# Patient Record
Sex: FEMALE | Race: WHITE | Employment: UNEMPLOYED | URBAN - METROPOLITAN AREA
[De-identification: names, ages, dates, MRNs, and addresses within clinical notes are randomized per-mention and may not be internally consistent; named-entity substitution may affect disease eponyms.]

---

## 2024-04-15 ENCOUNTER — LAB ENCOUNTER (OUTPATIENT)
Dept: LAB | Age: 29
End: 2024-04-15
Attending: ADVANCED PRACTICE MIDWIFE
Payer: COMMERCIAL

## 2024-04-15 ENCOUNTER — HOSPITAL ENCOUNTER (OUTPATIENT)
Dept: ULTRASOUND IMAGING | Facility: HOSPITAL | Age: 29
Discharge: HOME | End: 2024-04-15
Attending: ADVANCED PRACTICE MIDWIFE
Payer: COMMERCIAL

## 2024-04-15 ENCOUNTER — OFFICE VISIT (OUTPATIENT)
Dept: OBGYN CLINIC | Facility: CLINIC | Age: 29
End: 2024-04-15

## 2024-04-15 ENCOUNTER — TELEPHONE (OUTPATIENT)
Dept: OBGYN CLINIC | Facility: CLINIC | Age: 29
End: 2024-04-15

## 2024-04-15 VITALS
SYSTOLIC BLOOD PRESSURE: 131 MMHG | HEIGHT: 64 IN | WEIGHT: 180 LBS | HEART RATE: 74 BPM | DIASTOLIC BLOOD PRESSURE: 80 MMHG | BODY MASS INDEX: 30.73 KG/M2

## 2024-04-15 DIAGNOSIS — Z83.49 FAMILY HISTORY OF THYROID DISORDER: ICD-10-CM

## 2024-04-15 DIAGNOSIS — O26.859 SPOTTING IN PREGNANCY (HCC): ICD-10-CM

## 2024-04-15 DIAGNOSIS — O36.80X0 PREGNANCY WITH INCONCLUSIVE FETAL VIABILITY, SINGLE OR UNSPECIFIED FETUS (HCC): Primary | ICD-10-CM

## 2024-04-15 DIAGNOSIS — N76.0 VAGINITIS AND VULVOVAGINITIS: ICD-10-CM

## 2024-04-15 DIAGNOSIS — N92.6 MISSED MENSES: Primary | ICD-10-CM

## 2024-04-15 DIAGNOSIS — Z11.3 SCREEN FOR STD (SEXUALLY TRANSMITTED DISEASE): ICD-10-CM

## 2024-04-15 LAB
B-HCG SERPL-ACNC: ABNORMAL MIU/ML
CONTROL LINE PRESENT WITH A CLEAR BACKGROUND (YES/NO): YES YES/NO
PREGNANCY TEST, URINE: POSITIVE
RH BLOOD TYPE: POSITIVE
T4 FREE SERPL-MCNC: 1.2 NG/DL (ref 0.8–1.7)
TSI SER-ACNC: 2.06 MIU/ML (ref 0.55–4.78)

## 2024-04-15 PROCEDURE — 76801 OB US < 14 WKS SINGLE FETUS: CPT | Performed by: ADVANCED PRACTICE MIDWIFE

## 2024-04-15 PROCEDURE — 81025 URINE PREGNANCY TEST: CPT | Performed by: ADVANCED PRACTICE MIDWIFE

## 2024-04-15 PROCEDURE — 99203 OFFICE O/P NEW LOW 30 MIN: CPT | Performed by: ADVANCED PRACTICE MIDWIFE

## 2024-04-15 PROCEDURE — 3079F DIAST BP 80-89 MM HG: CPT | Performed by: ADVANCED PRACTICE MIDWIFE

## 2024-04-15 PROCEDURE — 3075F SYST BP GE 130 - 139MM HG: CPT | Performed by: ADVANCED PRACTICE MIDWIFE

## 2024-04-15 PROCEDURE — 84439 ASSAY OF FREE THYROXINE: CPT | Performed by: ADVANCED PRACTICE MIDWIFE

## 2024-04-15 PROCEDURE — 86900 BLOOD TYPING SEROLOGIC ABO: CPT | Performed by: ADVANCED PRACTICE MIDWIFE

## 2024-04-15 PROCEDURE — 3008F BODY MASS INDEX DOCD: CPT | Performed by: ADVANCED PRACTICE MIDWIFE

## 2024-04-15 PROCEDURE — 84443 ASSAY THYROID STIM HORMONE: CPT | Performed by: ADVANCED PRACTICE MIDWIFE

## 2024-04-15 PROCEDURE — 76817 TRANSVAGINAL US OBSTETRIC: CPT | Performed by: ADVANCED PRACTICE MIDWIFE

## 2024-04-15 PROCEDURE — 86901 BLOOD TYPING SEROLOGIC RH(D): CPT | Performed by: ADVANCED PRACTICE MIDWIFE

## 2024-04-15 PROCEDURE — 84702 CHORIONIC GONADOTROPIN TEST: CPT | Performed by: ADVANCED PRACTICE MIDWIFE

## 2024-04-15 NOTE — PROGRESS NOTES
Subjective:   Patient ID: Arlen Oliveira is a 28 year old female.    Arlen presents for missed menses visit and with concern for spotting. LMP 2/8/24 with regular menses. +HPT 3/16/24. Had miscarriage in January at 5wks    Has been feeling well. 7pm last night had brown spotting that changed to bright red. Bleeding lasted an hour and then had spotting. Gone this morning. Has had some mild cramping over last few weeks. [place] on Friday. Denies vaginal itching, irritation.     Denies other medical conditions        History/Other:   Review of Systems   Constitutional:  Negative for fever.   Genitourinary:  Positive for pelvic pain (mild cramping) and vaginal bleeding. Negative for vaginal discharge and vaginal pain.   All other systems reviewed and are negative.    No current outpatient medications on file.     Allergies:No Known Allergies    Objective:   Physical Exam  Vitals and nursing note reviewed.   Constitutional:       General: She is not in acute distress.     Appearance: Normal appearance. She is not ill-appearing, toxic-appearing or diaphoretic.   Cardiovascular:      Pulses: Normal pulses.   Pulmonary:      Effort: Pulmonary effort is normal.   Genitourinary:     General: Normal vulva.      Labia:         Right: No rash, tenderness, lesion or injury.         Left: No rash, tenderness, lesion or injury.       Vagina: No signs of injury and foreign body. Vaginal discharge (slightly brown discharge) present. No erythema, tenderness, bleeding, lesions or prolapsed vaginal walls.      Cervix: No cervical motion tenderness, discharge, friability, lesion, erythema, cervical bleeding or eversion.      Uterus: Enlarged (6wks). Not deviated, not fixed, not tender and no uterine prolapse.       Adnexa:         Right: No mass, tenderness or fullness.          Left: No mass, tenderness or fullness.     Neurological:      Mental Status: She is alert and oriented to person, place, and time.   Psychiatric:          Mood and Affect: Mood normal.         Behavior: Behavior normal.         Thought Content: Thought content normal.         Judgment: Judgment normal.         Assessment & Plan:   1. Missed menses    2. Spotting in pregnancy (HCC)    3. Family history of thyroid disorder    4. Vaginitis and vulvovaginitis    5. Screen for STD (sexually transmitted disease)        Orders Placed This Encounter   Procedures    POC Urine pregnancy test [26369]    HCG, Beta Subunit (Quant Pregnancy Test)    TSH and Free T4    Patient Blood Type (ABORh)    Vaginitis Vaginosis PCR Panel    Chlamydia/GC PCR Combo       Meds This Visit:  Requested Prescriptions      No prescriptions requested or ordered in this encounter       Imaging & Referrals:  None    1.  Discussed importance of folic acid, calcium, vitamin D.   2.  Reviewed pregnancy recommendations regarding weight gain, diet, fish consumption, consumption of deli-meats and hot dogs, making sure food is appropriately cooked and washed to avoid food-borne illnesses.    3.  Travel discussed, avoid travel to Covid zones, use of support stockings with flights longer than 3 hours, movement every 2-3 hours if driving  4.  Discussed exercise, avoid jacuzzi, sauna, hot tubs and steam rooms.   5.  Discussed avoidance of alcohol, smoking, and minimizing caffeine.    6.  Warning signs reviewed advised to call office if occur.    7.  First Trimester chromosomal screening Cell free Fetal DNA and US schedule discussed.  Desires First trimester screen testing  8. Genetic screening and ACOG/ ACMG guidelines for CF, SMA, Fragile X & hemoglobinopathies.  Informrd our offices uses the Preparent Standard Panel but other sources are available.  If interested they should check their insurance and various costs  9. ONTD risks discussed. No risk factors identified.     Hcg and ultrasound ordered for spotting. Will discuss plan once resulted    13.  30 minutes face to face counseling, chart review, orders and  coordination of care

## 2024-04-15 NOTE — TELEPHONE ENCOUNTER
Discussed ultrasound results with patient and recommended repeat HCG on Wednesday or Thursday and repeat ultrasound in 1 week. Reviewed ectopic and miscarriage warning signs and pt instructed to call with any. Pt verbalized understanding and agreement with all instructions and plan.

## 2024-04-16 ENCOUNTER — TELEPHONE (OUTPATIENT)
Dept: OBGYN CLINIC | Facility: CLINIC | Age: 29
End: 2024-04-16

## 2024-04-16 DIAGNOSIS — O36.80X0 PREGNANCY WITH INCONCLUSIVE FETAL VIABILITY, SINGLE OR UNSPECIFIED FETUS (HCC): Primary | ICD-10-CM

## 2024-04-16 LAB
BV BACTERIA DNA VAG QL NAA+PROBE: NEGATIVE
C GLABRATA DNA VAG QL NAA+PROBE: NEGATIVE
C KRUSEI DNA VAG QL NAA+PROBE: NEGATIVE
C TRACH DNA SPEC QL NAA+PROBE: NEGATIVE
CANDIDA DNA VAG QL NAA+PROBE: POSITIVE
N GONORRHOEA DNA SPEC QL NAA+PROBE: NEGATIVE
T VAGINALIS DNA VAG QL NAA+PROBE: NEGATIVE

## 2024-04-16 NOTE — TELEPHONE ENCOUNTER
Name and  verified    Order placed with [facility] to see if they have availability that is closer. Patient provided scheduling information.

## 2024-04-16 NOTE — TELEPHONE ENCOUNTER
Pt is asking if office can get her closer ultrasound appt for next week, Pt is booked in [place] , said too far

## 2024-04-18 ENCOUNTER — TELEPHONE (OUTPATIENT)
Dept: OBGYN CLINIC | Facility: CLINIC | Age: 29
End: 2024-04-18

## 2024-04-18 NOTE — TELEPHONE ENCOUNTER
Spoke with patient and answered all of her questions. She will call us when she gets a notice that her bHCG is back and we can discuss updates at that point. Reviewed bleeding precautions. She voiced understanding and agreed with plan. All questions answered.

## 2024-04-18 NOTE — TELEPHONE ENCOUNTER
Pt is waiting at [facility] no HCG order ,   Fax @ [phone]  Pt is waiting

## 2024-04-18 NOTE — TELEPHONE ENCOUNTER
10w 0d      Pt calling back to continue discussion. Pt asking about management of possible miscarriage. Overnight bleeding saturated pad approximately 50% per pt. Period-like bleeding continues today. Pt currently reports mild cramping, tolerable pain level. Pt just had HCG level drawn at [facility]. Pt has follow-up US scheduled Tuesday.    Pt request to discuss plan of care with Midwife. Call placed to TM, on-call. Lmtcb. Also routing message.

## 2024-04-18 NOTE — TELEPHONE ENCOUNTER
Attempted to reach patient by phone to review her concerns related to other telephone message of this date. No answer. Left brief voicemail. Stated she should call back and ask the nurse to page me.

## 2024-04-18 NOTE — TELEPHONE ENCOUNTER
Order faxed to [facility] per pt request. Call placed to pt to inform. LEXY.     Pt has additional questions. Pt with history of miscarriage in January and is currently having vaginal bleeding. US showed inconclusive fetal viability. Call interrupted due to pt currently at [facility] and was called to complete labs. Pt states she will call back. Awaiting return call.

## 2024-04-19 ENCOUNTER — TELEPHONE (OUTPATIENT)
Dept: OBGYN CLINIC | Facility: CLINIC | Age: 29
End: 2024-04-19

## 2024-04-19 LAB — HCG, TOTAL, QN: ABNORMAL MIU/ML

## 2024-04-19 NOTE — TELEPHONE ENCOUNTER
Pt called to follow up on appt for 4/23 after ultrasound. Informed Pt awaiting response from Marlen. She will wait to hear back.

## 2024-04-19 NOTE — TELEPHONE ENCOUNTER
Pregnancy with inconclusive fetal viability. Ultrasound on 04/15/24 showed sac with no pole or yolk sac. Hcg done yesterday per [facility]. Pt has ultrasound scheduled for this Tuesday. Pt had moderate bleeding yesterday. Pt wants to know if she still needs to complete the ultrasound for Tuesday. Please advise.

## 2024-04-19 NOTE — TELEPHONE ENCOUNTER
Advised pt Marlen is advising that she keep her appointment for Tuesday 04/23/24. Pt voices understaning, but was wondering if her ultrasound could try to be moved to the [place] location since [place] is 1 hour from her. Ultrasound called and pt able to be scheduled for 3 pm on Tuesday in [place]. Pt has a post miscarriage appointment with Marlen at 11:15 am that same day in [place]. Pt would like to schedule her appointment for after the ultrasound just to make it easier for her and her . No openings noted. Can pt be fit in with Marlen after her ultrasound?

## 2024-04-20 DIAGNOSIS — O03.4 INCOMPLETE SPONTANEOUS ABORTION (HCC): Primary | ICD-10-CM

## 2024-04-20 RX ORDER — MISOPROSTOL 200 UG/1
800 TABLET ORAL ONCE
Qty: 8 TABLET | Refills: 0 | Status: SHIPPED | OUTPATIENT
Start: 2024-04-20 | End: 2024-04-20

## 2024-04-20 NOTE — TELEPHONE ENCOUNTER
Confirmed that patient can initiate misoprostol 800mcg PV and repeat in 12 hours if pregnancy not fully passed. Pt notified by phone and eRx sent. Again reviewed bleeding precautions and need for follow-up in our office on Tuesday. Pt to keep scheduled Tuesday ultrasound at this point. She voiced understanding and agreed with plan. All questions answered.

## 2024-04-21 ENCOUNTER — TELEPHONE (OUTPATIENT)
Dept: OBGYN CLINIC | Facility: CLINIC | Age: 29
End: 2024-04-21

## 2024-04-21 DIAGNOSIS — O03.4 INCOMPLETE SPONTANEOUS ABORTION (HCC): Primary | ICD-10-CM

## 2024-04-21 RX ORDER — MISOPROSTOL 200 UG/1
800 TABLET ORAL ONCE
Qty: 4 TABLET | Refills: 0 | Status: SHIPPED | OUTPATIENT
Start: 2024-04-21 | End: 2024-04-21

## 2024-04-21 NOTE — TELEPHONE ENCOUNTER
Pt paged to report that she took the first dose of Cytotec yesterday afternoon. Continued to cramp into the evening, needing ibuprofen. Never passed anything that she would call products of conception. Today, cramping is less but bleeding is still like a period. States she would soak a pad in a few hours if she left it in place long enough. Has not taken ibuprofen since about midnight. Pt states that she accidentally dropped 2 of the pills into the toilet yesterday so needs a refill if she is to complete the second dose. eRx sent and patient advised to take the second dose. If no change in status, to move her appointment up from Tuesday to Monday morning. She voiced understanding and agreed with plan. All questions answered.

## 2024-04-22 ENCOUNTER — TELEPHONE (OUTPATIENT)
Dept: OBGYN CLINIC | Facility: CLINIC | Age: 29
End: 2024-04-22

## 2024-04-22 NOTE — TELEPHONE ENCOUNTER
Pt trying to get f/u appt after ultrasound at 3pm at [facility].  Pt still bleeding post miscarriage.    Not sure if someone can pull-up the ultrasound appt earlier, so she can be seen earlier.    Pls advise

## 2024-04-22 NOTE — TELEPHONE ENCOUNTER
Name and  verified    Patient reports she is having some bleeding, but denies going through pad in hour. Had cramping over weekend, but improving now. Unsure if she has pass products of conception.     Patient has ultrasound scheduled for tomorrow and wanted to schedule midwife appt for after ultrasound. Scheduled for appt with COSTA after ultrasound.

## 2024-04-23 ENCOUNTER — PATIENT MESSAGE (OUTPATIENT)
Dept: OBGYN CLINIC | Facility: CLINIC | Age: 29
End: 2024-04-23

## 2024-04-23 ENCOUNTER — LAB ENCOUNTER (OUTPATIENT)
Dept: LAB | Facility: HOSPITAL | Age: 29
End: 2024-04-23
Attending: ADVANCED PRACTICE MIDWIFE
Payer: COMMERCIAL

## 2024-04-23 ENCOUNTER — HOSPITAL ENCOUNTER (OUTPATIENT)
Dept: ULTRASOUND IMAGING | Facility: HOSPITAL | Age: 29
Discharge: HOME | End: 2024-04-23
Attending: ADVANCED PRACTICE MIDWIFE
Payer: COMMERCIAL

## 2024-04-23 ENCOUNTER — OFFICE VISIT (OUTPATIENT)
Dept: OBGYN CLINIC | Facility: CLINIC | Age: 29
End: 2024-04-23

## 2024-04-23 VITALS
SYSTOLIC BLOOD PRESSURE: 120 MMHG | DIASTOLIC BLOOD PRESSURE: 80 MMHG | WEIGHT: 181 LBS | BODY MASS INDEX: 31 KG/M2 | HEART RATE: 73 BPM

## 2024-04-23 DIAGNOSIS — Z13.0 SCREENING FOR DEFICIENCY ANEMIA: ICD-10-CM

## 2024-04-23 DIAGNOSIS — Z87.59 HISTORY OF MISCARRIAGE: ICD-10-CM

## 2024-04-23 DIAGNOSIS — Z87.59 HISTORY OF MISCARRIAGE: Primary | ICD-10-CM

## 2024-04-23 DIAGNOSIS — O36.80X0 PREGNANCY WITH INCONCLUSIVE FETAL VIABILITY, SINGLE OR UNSPECIFIED FETUS (HCC): ICD-10-CM

## 2024-04-23 LAB
B-HCG SERPL-ACNC: 568.5 MIU/ML
DEPRECATED RDW RBC AUTO: 41.6 FL (ref 35.1–46.3)
ERYTHROCYTE [DISTWIDTH] IN BLOOD BY AUTOMATED COUNT: 11.9 % (ref 11–15)
HCT VFR BLD AUTO: 39.6 %
HGB BLD-MCNC: 13.6 G/DL
MCH RBC QN AUTO: 32.5 PG (ref 26–34)
MCHC RBC AUTO-ENTMCNC: 34.3 G/DL (ref 31–37)
MCV RBC AUTO: 94.5 FL
PLATELET # BLD AUTO: 268 10(3)UL (ref 150–450)
RBC # BLD AUTO: 4.19 X10(6)UL
THYROPEROXIDASE AB SERPL-ACNC: 29 U/ML (ref ?–60)
WBC # BLD AUTO: 13.9 X10(3) UL (ref 4–11)

## 2024-04-23 PROCEDURE — 3074F SYST BP LT 130 MM HG: CPT | Performed by: ADVANCED PRACTICE MIDWIFE

## 2024-04-23 PROCEDURE — 86147 CARDIOLIPIN ANTIBODY EA IG: CPT | Performed by: ADVANCED PRACTICE MIDWIFE

## 2024-04-23 PROCEDURE — 76817 TRANSVAGINAL US OBSTETRIC: CPT | Performed by: ADVANCED PRACTICE MIDWIFE

## 2024-04-23 PROCEDURE — 85610 PROTHROMBIN TIME: CPT | Performed by: ADVANCED PRACTICE MIDWIFE

## 2024-04-23 PROCEDURE — 85390 FIBRINOLYSINS SCREEN I&R: CPT | Performed by: ADVANCED PRACTICE MIDWIFE

## 2024-04-23 PROCEDURE — 85598 HEXAGNAL PHOSPH PLTLT NEUTRL: CPT | Performed by: ADVANCED PRACTICE MIDWIFE

## 2024-04-23 PROCEDURE — 85730 THROMBOPLASTIN TIME PARTIAL: CPT | Performed by: ADVANCED PRACTICE MIDWIFE

## 2024-04-23 PROCEDURE — 76801 OB US < 14 WKS SINGLE FETUS: CPT | Performed by: ADVANCED PRACTICE MIDWIFE

## 2024-04-23 PROCEDURE — 3079F DIAST BP 80-89 MM HG: CPT | Performed by: ADVANCED PRACTICE MIDWIFE

## 2024-04-23 PROCEDURE — 86376 MICROSOMAL ANTIBODY EACH: CPT | Performed by: ADVANCED PRACTICE MIDWIFE

## 2024-04-23 PROCEDURE — 85613 RUSSELL VIPER VENOM DILUTED: CPT | Performed by: ADVANCED PRACTICE MIDWIFE

## 2024-04-23 PROCEDURE — 85027 COMPLETE CBC AUTOMATED: CPT | Performed by: ADVANCED PRACTICE MIDWIFE

## 2024-04-23 PROCEDURE — 99214 OFFICE O/P EST MOD 30 MIN: CPT | Performed by: ADVANCED PRACTICE MIDWIFE

## 2024-04-23 PROCEDURE — 84702 CHORIONIC GONADOTROPIN TEST: CPT | Performed by: ADVANCED PRACTICE MIDWIFE

## 2024-04-23 NOTE — PROGRESS NOTES
[facility]  Midwifery Focused Gynecology Problem Exam    Arlen Oliveira is a 28 year old female presenting for Follow - Up ([facility] follow up )  .    HPI:     Chief Complaint   Patient presents with    Follow - Up     MAB follow up      Here for miscarriage follow up.     Sunday night bleeding like a period. Has had cramping. Has been taking motrin & tylenol.    Did 2 doses of cytotec over the weekend. One Saturday and one Sunday.  Right before ultrasound passed what appeared to be a large sac and one smaller clot. Not soaking a pad in less than an hour. No dizziness. Cramping has decreased now.       Had SAB in January.   Component      Latest Ref Rng 4/15/2024  4/18/2024   ABO BLOOD TYPE O     RH Factor Positive     HCG QUANTITATIVE      <=4.2 mIU/mL 17,497.8 (H)     HCG, TOTAL, QN      mIU/mL  12,663 (H)       Legend:  (H) High      US PREG 1ST TRIM W/EV (CPT=76801/47583)    Result Date: 4/23/2024  PROCEDURE: US PREG 1ST TRIM W/EV (CPT=76801/07817)  COMPARISON: [facility], US PREG 1ST TRIM W/EV (CPT=76801/97565), 4/15/2024, 3:07 PM.  INDICATIONS: Pregnancy with inconclusive fetal viability, single or unspecified fetus.  TECHNIQUE: Transabdominal and endovaginal imaging for obstetrical and fetal evaluation was performed.  FINDINGS:  GESTATIONAL SAC: Not visualized. The previously seen candidate gestational sac is no longer evident. FETAL POLE/EMBRYO: Not apparent. YOLK SAC: Not seen. CARDIAC ACTIVITY: None detected. DEXTER VOLUME: Too early for assessment. PLACENTA LOCATION: Too early for assessment. UTERUS: Measures 8.3 x 4.7 x 5.5 cm with complex echogenicity within the thickened endometrial canal; complex echogenicity extends into the lower uterine segment.  MENSTRUAL AGE/MARIA DEL ROSARIO: 10 weeks, 5 days; 02/08/2024 CRL: Not applicable. ULTRASOUND AGE/MARIA DEL ROSARIO: Not applicable.  OVARIES AND ADNEXA:  RIGHT OVARY: Largely obscured by intervening bowel gas; a candidate right ovary measures 3.3 x 2.8 x 3.4 cm with  possible complex lesion measuring 1.9 x 1.6 x 2.2 cm. LEFT OVARY: Measures 3.0 x 2.2 x 1.8 cm. CUL-DE-SAC: Complex echogenic free pelvic fluid may be present.  OTHER: Negative.          CONCLUSION:  1. No sonographically discernible intrauterine pregnancy. Differential diagnostic possibilities include complete , early gestation which cannot be detected sonographically, or nonvisualized extrauterine/ectopic pregnancy. Continued serial quantitative beta hCG level analysis and followup sonography, as clinically warranted, are recommended.  2. Lesser incidental findings as above.    elm-remote.   Dictated by (CST): Francisco Nolan MD on 2024 at 4:08 PM     Finalized by (CST): rFancisco Nolan MD on 2024 at 4:13 PM          US PREG 1ST TRIM W/EV (CPT=76801/20491)    Result Date: 4/15/2024  PROCEDURE: US PREG 1ST TRIM W/EV (CPT=76801/82249)  COMPARISON: None.  INDICATIONS: Spotting in pregnancy  TECHNIQUE: Transabdominal and endovaginal imaging for obstetrical and fetal evaluation was performed.  FINDINGS: Normal bladder.  Anteverted uterus 10 x 4.5 x 5.5 centimeter.  Thickened endometrium, 2.4 centimeter.  1.4 x 0.8 x 0.9 centimeter gestational sac in the endometrial cavity but no yolk sac or fetal pole.  Normal-size ovaries.  Right ovary contains 2 centimeter corpus luteal cyst.  No adnexal mass.  No free fluid         CONCLUSION:   Pregnancy of unknown location  There is a fluid collection/gestational sac within the endometrial cavity but this does not contain a yolk sac or fetal pole  Clinical correlation and follow-up are needed     Dictated by (CST): Bradley Valle MD on 4/15/2024 at 4:02 PM     Finalized by (CST): Bradley Valle MD on 4/15/2024 at 4:04 PM            PHQ-2 not done in last 12 months! Please administer and refresh!         Depression Screening (PHQ-2/PHQ-9): Over the LAST 2 WEEKS                         Medications (Active prior to today's visit):  No current outpatient medications on  file.     Allergies:  No Known Allergies  HISTORY:   Menstrual History:  OB History    Para Term  AB Living   2 0 0 0 1 0   SAB IAB Ectopic Multiple Live Births   1 0 0 0 0      Patient's last menstrual period was 2024 (exact date).         No past medical history on file.  Past Surgical History:   Procedure Laterality Date    Spinal fusion      Helen teeth removed       Family History   Problem Relation Age of Onset    Other (bladder cancer) Mother     Hypertension Mother      Social History     Socioeconomic History    Marital status:      Spouse name: Not on file    Number of children: Not on file    Years of education: Not on file    Highest education level: Not on file   Occupational History    Not on file   Tobacco Use    Smoking status: Never    Smokeless tobacco: Never   Substance and Sexual Activity    Alcohol use: Not Currently     Alcohol/week: 1.0 standard drink of alcohol     Types: 1 Standard drinks or equivalent per week     Comment: soically    Drug use: Never    Sexual activity: Not on file   Other Topics Concern    Not on file   Social History Narrative    Not on file     Social Determinants of Health     Financial Resource Strain: Not on file   Food Insecurity: Not on file   Transportation Needs: Not on file   Stress: Not on file   Housing Stability: Not on file       ROS:   Review of Systems   Constitutional: Negative.    Respiratory: Negative.     Cardiovascular: Negative.    Gastrointestinal:  Positive for abdominal pain (cramping).   Genitourinary:  Positive for vaginal bleeding. Negative for difficulty urinating, dysuria, enuresis, flank pain, vaginal discharge and vaginal pain.   Neurological: Negative.         PHYSICAL EXAM:   /80   Pulse 73   Wt 181 lb (82.1 kg)   LMP 2024 (Exact Date)   BMI 31.07 kg/m²   Physical Exam  Constitutional:       General: She is not in acute distress.     Appearance: Normal appearance. She is not ill-appearing.    Pulmonary:      Effort: Pulmonary effort is normal.   Neurological:      Mental Status: She is alert and oriented to person, place, and time.   Psychiatric:         Behavior: Behavior normal.         Thought Content: Thought content normal.           ASSESSMENT:    Diagnoses and all orders for this visit:    History of miscarriage  -     Anticardiolipin AB, IgG/M, QN; Future  -     Lupus Anticoagulant Comp; Future  -     Thyroid Peroxidase (TPO) AB; Future  -     HCG, Beta Subunit (Quant Pregnancy Test); Future    Screening for deficiency anemia  -     CBC, Platelet; No Differential; Future      Discussed findings with pt. Sac no longer noted in uterus, this is likely what she passed right before ultrasound. I would expect bleeding to now taper down. Should monitor and if continued heavy bleeding or clots would be concerned with retained POC. If that occurs could need D & C. Reviewed her HCG levels. Recommend repeat today, if significant drop and bleeding remains light would montior weekly until back to zero. Recommend wait 1-2 cycles until TTC again.       She has many questions about why this happened and what she can do differently. Is interested in doing some basic bloodwork and desires referrel to Dr Rito Gaston for further evaluation.     We discussed that  miscarriage is common and that it is most often d/t chromosomal abnormalities. There are more extensive evals that can be done but typically after 3 consecutive losses as then considered recurrent losses. We discussed what those might include- microarray of her and , testing for clotting disorders, etc. We discussed her cycles and recommend monitoring for length of luteal phase, if short could be an issue with progesterone. Supplementation with progesterone in future pregnancy discussed and controversial nature of such. Options reviewed.     Alternative medicine options discussed- acupuncture, functional medicine.     My total time spent caring for  the patient on the day of the encounter:  35 minutes, which included: chart review, exam, care coordination, charting, and counseling as per above.        Ruth Damon CNM  4/23/2024  4:19 PM

## 2024-04-24 ENCOUNTER — TELEPHONE (OUTPATIENT)
Dept: OBGYN CLINIC | Facility: CLINIC | Age: 29
End: 2024-04-24

## 2024-04-24 DIAGNOSIS — O03.9 MISCARRIAGE (HCC): Primary | ICD-10-CM

## 2024-04-24 NOTE — TELEPHONE ENCOUNTER
PC to patient, notified of drop in HCG. To repeat in 1 wk. Would like to go to [facility]. Order placed fro [facility]. Referral for Dr Rito Gaston was placed. I notified her during her visit she should check with insurance regarding coverage. Also advised of + yeast. States she saw that and did an OTC monistat and symptoms seem to improve. She does have a few days left so desires just to use that.

## 2024-04-24 NOTE — TELEPHONE ENCOUNTER
Kelly Gauthier, RN 4/24/2024 8:35 AM CDT    Orders already placed for HCG at [facility]    Please advise regarding recommendation of ultrasound and referral  ----- Message -----  From: Arlen Oliveira  Sent: 4/23/2024 7:02 PM CDT  To: [facility] Ob/Gyne Midwifery Clinical Pool  Subject: F/u apt     Sounds good. Thank you for getting back to me. Are you able to put the order in for [facility] for next week? I can go next Tuesday. [facility] is closer to my house.  Also, I was wondering if you guys do saline flushed ultra sounds to look for polyps or if that’s something you think I should do?  Secondly, I forgot to follow up to receive the referral for - Heather myles ( fertility specialist) in [place]. Is that something you would be able to do and I will see what our insurance covers?  Thank you so much-  Arlen

## 2024-04-25 LAB
CARDIOLIPIN IGG SERPL-ACNC: 3.1 GPL (ref ?–10)
CARDIOLIPIN IGM SERPL-ACNC: 2.2 MPL (ref ?–10)

## 2024-04-26 ENCOUNTER — TELEPHONE (OUTPATIENT)
Dept: OBGYN CLINIC | Facility: CLINIC | Age: 29
End: 2024-04-26

## 2024-04-26 DIAGNOSIS — Z87.59 HISTORY OF MISCARRIAGE: Primary | ICD-10-CM

## 2024-04-26 LAB
APTT PPP: 29 SECONDS (ref 23.3–35.6)
CONFIRM APTT STACLOT: NEGATIVE
CONFIRM DRVVT: 1 S (ref 0–1.1)
PROTHROMBIN TIME: 13 SECONDS (ref 11.6–14.8)

## 2024-05-01 ENCOUNTER — TELEPHONE (OUTPATIENT)
Dept: OBGYN CLINIC | Facility: CLINIC | Age: 29
End: 2024-05-01

## 2024-05-01 LAB — HCG, TOTAL, QN: 71 MIU/ML

## 2024-05-01 NOTE — TELEPHONE ENCOUNTER
Pt Name and  verified.  HCG level resulted from yesterday and it is now 71.   Pt advised to complete test again in a week to see if the numbers continue to trend down. Voiced understanding.

## 2024-05-03 DIAGNOSIS — O03.9 MISCARRIAGE (HCC): Primary | ICD-10-CM

## 2024-05-07 ENCOUNTER — TELEPHONE (OUTPATIENT)
Dept: OBGYN CLINIC | Facility: CLINIC | Age: 29
End: 2024-05-07

## 2024-05-07 DIAGNOSIS — O26.851 SPOTTING AFFECTING PREGNANCY IN FIRST TRIMESTER (HCC): Primary | ICD-10-CM

## 2024-05-07 DIAGNOSIS — O03.9 SAB (SPONTANEOUS ABORTION) (HCC): ICD-10-CM

## 2024-05-07 NOTE — TELEPHONE ENCOUNTER
Patient states she has a yeast infection and tried to treat with over the counter medication, helped slightly but still having symptoms, wondering if something can be called in, leaving town this weekend. Please advise

## 2024-05-07 NOTE — TELEPHONE ENCOUNTER
Patient at [facility], still not seeing the fax for the HCG labwork.    Pls advise  Fax [phone]

## 2024-05-07 NOTE — TELEPHONE ENCOUNTER
LMTCB    Pt having symptoms of yeast since 04/24/24. Pt has been using Monistat with some relief, but still having vaginal irritation. Pt leaving to go out of town this weekend and would like medication ordered. Please advise.

## 2024-05-08 LAB — HCG, TOTAL, QN: 26 MIU/ML

## 2024-05-08 RX ORDER — FLUCONAZOLE 150 MG/1
150 TABLET ORAL
Qty: 2 TABLET | Refills: 0 | Status: SHIPPED | OUTPATIENT
Start: 2024-05-08 | End: 2024-05-12

## 2024-05-08 NOTE — TELEPHONE ENCOUNTER
I have sent in Rx for fluconazole. If symptoms not resolved with tx she should come into clinic for evaluation. Thanks COSTA

## 2024-05-10 DIAGNOSIS — O03.9 MISCARRIAGE (HCC): Primary | ICD-10-CM

## 2024-05-20 ENCOUNTER — TELEPHONE (OUTPATIENT)
Dept: OBGYN CLINIC | Facility: CLINIC | Age: 29
End: 2024-05-20

## 2024-05-20 NOTE — TELEPHONE ENCOUNTER
Pt had miscarriage on 04/23/24. Pt still doing Hcg levels. Last Hcg on 05/07/24-26. Pt wants to schedule appointment wit the [facility] to discuss reoccurring miscarriages. Pt had a few questions regarding scheduling appointment. Questions answered. Pt denies any other questions or concerns.

## 2024-05-20 NOTE — TELEPHONE ENCOUNTER
Patient was recently referred to Dr Chávez. She is hoping to discuss if there are other providers in that office that would be acceptable to see and if it is ok to be seen tomorrow 5/21. Please advise.

## 2024-05-22 ENCOUNTER — TELEPHONE (OUTPATIENT)
Dept: OBGYN CLINIC | Facility: CLINIC | Age: 29
End: 2024-05-22

## 2024-05-22 LAB — HCG, TOTAL, QN: 10 MIU/ML

## 2024-05-22 NOTE — TELEPHONE ENCOUNTER
Pt Name and  verified.  Pt states that she had a [facility] draw for hcg and it was 10.   Pt states that she began to have some cramping and bleeding. Advised that this can occur but we would still recommend having the repeat hcg in a week. Voiced understanding.

## 2024-05-29 DIAGNOSIS — O03.9 MISCARRIAGE (HCC): Primary | ICD-10-CM

## 2024-06-07 ENCOUNTER — MOBILE ENCOUNTER (OUTPATIENT)
Dept: OBGYN CLINIC | Facility: CLINIC | Age: 29
End: 2024-06-07

## 2024-06-07 DIAGNOSIS — O03.9 MISCARRIAGE (HCC): Primary | ICD-10-CM

## 2024-06-07 LAB — HCG, TOTAL, QN: 5 MIU/ML

## 2024-06-11 ENCOUNTER — LAB ENCOUNTER (OUTPATIENT)
Dept: LAB | Facility: HOSPITAL | Age: 29
End: 2024-06-11
Attending: ADVANCED PRACTICE MIDWIFE
Payer: COMMERCIAL

## 2024-06-11 ENCOUNTER — OFFICE VISIT (OUTPATIENT)
Dept: OBGYN CLINIC | Facility: CLINIC | Age: 29
End: 2024-06-11
Payer: COMMERCIAL

## 2024-06-11 ENCOUNTER — TELEPHONE (OUTPATIENT)
Dept: OBGYN CLINIC | Facility: CLINIC | Age: 29
End: 2024-06-11

## 2024-06-11 VITALS
SYSTOLIC BLOOD PRESSURE: 124 MMHG | DIASTOLIC BLOOD PRESSURE: 76 MMHG | HEIGHT: 64 IN | WEIGHT: 178.19 LBS | BODY MASS INDEX: 30.42 KG/M2

## 2024-06-11 DIAGNOSIS — O26.20 RECURRENT PREGNANCY LOSS, ANTEPARTUM CONDITION OR COMPLICATION (HCC): Primary | ICD-10-CM

## 2024-06-11 DIAGNOSIS — Z32.00 PREGNANCY EXAMINATION OR TEST, PREGNANCY UNCONFIRMED: Primary | ICD-10-CM

## 2024-06-11 DIAGNOSIS — O03.9 MISCARRIAGE (HCC): Primary | ICD-10-CM

## 2024-06-11 LAB — B-HCG SERPL-ACNC: 5.9 MIU/ML

## 2024-06-11 PROCEDURE — 3078F DIAST BP <80 MM HG: CPT | Performed by: OBSTETRICS & GYNECOLOGY

## 2024-06-11 PROCEDURE — 99244 OFF/OP CNSLTJ NEW/EST MOD 40: CPT | Performed by: OBSTETRICS & GYNECOLOGY

## 2024-06-11 PROCEDURE — 3008F BODY MASS INDEX DOCD: CPT | Performed by: OBSTETRICS & GYNECOLOGY

## 2024-06-11 PROCEDURE — 84702 CHORIONIC GONADOTROPIN TEST: CPT | Performed by: ADVANCED PRACTICE MIDWIFE

## 2024-06-11 PROCEDURE — 3074F SYST BP LT 130 MM HG: CPT | Performed by: OBSTETRICS & GYNECOLOGY

## 2024-06-11 NOTE — TELEPHONE ENCOUNTER
Pt scheduled 7/2 with DOV, encouraged pt to ask for myself if appt needs to be rescheduled to ease scheduling.      ----- Message from Sheila Chávez sent at 6/11/2024 11:48 AM CDT -----  Regarding: scheduling SIS  HIArlen has had recurrent pregnancy loss (miscarriage x 2) and I'd like to schedule an SIS for her.    She should get her next period end June / early July.  Can you please help her schedule SIS in the 10 or so days after that period?    Thank you!!  ~DOV

## 2024-06-11 NOTE — PATIENT INSTRUCTIONS
Possible additional lab:  Karyotype (to check your genetic make-up)    Tomás - semen analysis - (either primary care, or urologist)    Saline infusion ultrasound  Pro-fertility vitamins

## 2024-06-11 NOTE — PROGRESS NOTES
New Patient GYN History and Physical  [facility]    CHIEF COMPLAINT:    Chief Complaint   Patient presents with    Follow - Up     Referred by midwives. C/o 2 SAB in the last 4 months       HISTORY OF PRESENT ILLNESS:   Arlen Oliveira is a 28 year old female   who presents for consultation for recurrent pregnancy loss, sent by SHANELL Damon.      Normal APL labs in April, during .  TSH normal in April.    1st miscarriage  about 6 wks - had one period after this then got pregnant.  Then again April about 8-9 wks      Started bleeding and called on-call, and took some cytotec to help the process, US showed no cardiac activity.  Took a long time to pass the sac. Had to take 2 cytotec doses - passed the sac right before the US.    HCG was 10 when she got period.  Period was lighter than usual, and a little longer, other symptoms were more mild.      PAST MEDICAL HISTORY:   Past Medical History:    Recurrent pregnancy loss        PAST SURGICAL HISTORY:   Past Surgical History:   Procedure Laterality Date    Spinal fusion      [place] teeth removed          PAST OB HISTORY:  OB History    Para Term  AB Living   2       2     SAB IAB Ectopic Multiple Live Births   2              # Outcome Date GA Lbr Brennon/2nd Weight Sex Type Anes PTL Lv   2 SAB 2024     Biochemical      1 SAB                CURRENT MEDICATIONS:    No current outpatient medications on file.    ALLERGIES:  No Known Allergies    SOCIAL HISTORY:  Social History     Socioeconomic History    Marital status:    Tobacco Use    Smoking status: Never    Smokeless tobacco: Never   Substance and Sexual Activity    Alcohol use: Not Currently     Alcohol/week: 1.0 standard drink of alcohol     Types: 1 Standard drinks or equivalent per week     Comment: socially, couple times per month    Drug use: Never    Sexual activity: Yes     Partners: Male   Other Topics Concern    Blood Transfusions No       FAMILY  HISTORY:  Family History   Problem Relation Age of Onset    No Known Problems Father     Other (bladder cancer) Mother     Hypertension Mother     Blood Cancer Maternal Grandmother     Other (lung cancer) Maternal Grandfather     Cancer Paternal Grandmother         brain spread to breast    No Known Problems Sister     No Known Problems Brother      ASSESSMENTS:  PHYSICAL EXAM:   Patient's last menstrual period was 06/02/2024 (exact date).   Vitals:    06/11/24 1105   BP: 124/76   Weight: 178 lb 3.2 oz (80.8 kg)   Height: 64\"     CONSTITUTIONAL: Awake, alert, cooperative, no apparent distress, and appears stated age   NECK: Supple, symmetrical, trachea midline, no adenopathy, thyroid symmetric, not enlarged and no tenderness  LUNGS: No excess work of breathing  MUSCULOSKELETAL: There is no redness, warmth, or swelling of the joints. Tone is normal.  NEUROLOGIC: Patient is awake, alert and oriented to name, place and time. Casual gait is normal.  SKIN: no bruising or bleeding and no rashes  PSYCHIATRIC: Behavior:  Appropriate  Mood:  appropriate  ASSESSMENT AND PLAN:  1. Recurrent pregnancy loss, antepartum condition or complication (HCC)  - reviewed course of previous pregnancies.  - APLS labs and TSH normal at time of most recent miscarriage. No need to repeat as they were all normal.  - could consider karyotype testing - but we discussed low yield of abnormalities - this might be something to consider if another miscarriage and is ready for feritlity specialist.  - discussed option for routine gyn US vs SIS vs hysteroscopy for evaluation of endometrial cavity for possible polyp. Is most interested in SIS - I recommend scheduling within 10 days of a period. Then, if polyp can schedule removal procedure. We discussed how a polyp can increase chance for miscarriage  - I recommend bibiana's  Tomás get semenalysis to assess for sperm morphology.  - infor given for [facility] support group for emotional support  throughout this journey.  - When pregnant again, will check HCG and progesterone - as early as possible.     Total face to face time was 60 minutes, more than 50% of the time was spent in counseling and/or coordination of care related to above.  follow up as needed  Sheila Chávez, DO

## 2024-07-02 ENCOUNTER — PATIENT MESSAGE (OUTPATIENT)
Dept: OBGYN CLINIC | Facility: CLINIC | Age: 29
End: 2024-07-02

## 2024-07-02 ENCOUNTER — OFFICE VISIT (OUTPATIENT)
Dept: OBGYN CLINIC | Facility: CLINIC | Age: 29
End: 2024-07-02
Payer: COMMERCIAL

## 2024-07-02 VITALS
SYSTOLIC BLOOD PRESSURE: 138 MMHG | DIASTOLIC BLOOD PRESSURE: 82 MMHG | HEIGHT: 64 IN | BODY MASS INDEX: 30.9 KG/M2 | WEIGHT: 181 LBS

## 2024-07-02 DIAGNOSIS — O26.20 RECURRENT PREGNANCY LOSS, ANTEPARTUM CONDITION OR COMPLICATION (HCC): Primary | ICD-10-CM

## 2024-07-02 PROCEDURE — 3075F SYST BP GE 130 - 139MM HG: CPT | Performed by: OBSTETRICS & GYNECOLOGY

## 2024-07-02 PROCEDURE — 58340 CATHETER FOR HYSTEROGRAPHY: CPT | Performed by: OBSTETRICS & GYNECOLOGY

## 2024-07-02 PROCEDURE — 76831 ECHO EXAM UTERUS: CPT | Performed by: OBSTETRICS & GYNECOLOGY

## 2024-07-02 PROCEDURE — 3079F DIAST BP 80-89 MM HG: CPT | Performed by: OBSTETRICS & GYNECOLOGY

## 2024-07-02 PROCEDURE — 3008F BODY MASS INDEX DOCD: CPT | Performed by: OBSTETRICS & GYNECOLOGY

## 2024-07-02 RX ORDER — MISOPROSTOL 200 UG/1
TABLET ORAL
COMMUNITY
Start: 2024-04-21 | End: 2024-07-02

## 2024-07-02 RX ORDER — PROGESTERONE 200 MG/1
200 CAPSULE ORAL NIGHTLY
Qty: 30 CAPSULE | Refills: 2 | Status: SHIPPED | OUTPATIENT
Start: 2024-07-02

## 2024-07-02 RX ORDER — CIPROFLOXACIN AND DEXAMETHASONE 3; 1 MG/ML; MG/ML
SUSPENSION/ DROPS AURICULAR (OTIC)
COMMUNITY
Start: 2024-01-25 | End: 2024-07-02

## 2024-07-02 NOTE — PROGRESS NOTES
Procedure explained to patient and consent obtained.     Speculum placed in vagina.   Betadine prep x 3.  Tenaculum placed on anterior cervix.  HSG balloon catheter placed through cervical os and balloon inflated with 2 ml sterile saline. Speculum removed.     Transvaginal U/S performed as saline injected through the catheter into the uterus.     Findings: no specific findings noted, normal appearing endometrium and b/l ovaries.    Following the procedure the balloon was deflated and catheter removed.  Tenaculum removed with excellent hemostasis noted.  Results discussed with patient.  Patient tolerated the procedure well.    Plan: will give progesterone for 1st trimester support. Patient to take once has + pregnancy test and send me a message to get labs / prental care started.        Medications    progesterone 200 MG Oral Cap         Sheila Chávez DO

## 2024-07-11 ENCOUNTER — TELEPHONE (OUTPATIENT)
Dept: OBGYN CLINIC | Facility: CLINIC | Age: 29
End: 2024-07-11

## 2024-07-25 ENCOUNTER — PATIENT MESSAGE (OUTPATIENT)
Dept: OBGYN CLINIC | Facility: CLINIC | Age: 29
End: 2024-07-25

## 2024-09-09 DIAGNOSIS — M43.26 FUSION OF SPINE OF LUMBAR REGION: ICD-10-CM

## 2024-09-09 DIAGNOSIS — M54.59 OTHER LOW BACK PAIN: Primary | ICD-10-CM

## 2024-09-12 ENCOUNTER — HOSPITAL ENCOUNTER (OUTPATIENT)
Dept: MRI IMAGING | Age: 29
Discharge: HOME | End: 2024-09-12
Attending: ORTHOPAEDIC SURGERY

## 2024-09-12 DIAGNOSIS — M43.26 FUSION OF SPINE OF LUMBAR REGION: ICD-10-CM

## 2024-09-12 DIAGNOSIS — M54.59 OTHER LOW BACK PAIN: ICD-10-CM

## 2024-09-12 PROCEDURE — 72148 MRI LUMBAR SPINE W/O DYE: CPT

## 2024-09-17 ENCOUNTER — APPOINTMENT (OUTPATIENT)
Dept: MRI IMAGING | Age: 29
End: 2024-09-17
Attending: ORTHOPAEDIC SURGERY

## 2024-09-30 ENCOUNTER — TELEPHONE (OUTPATIENT)
Dept: NEUROLOGY | Age: 29
End: 2024-09-30

## 2024-10-08 DIAGNOSIS — N96 RECURRENT PREGNANCY LOSS: Primary | ICD-10-CM

## 2024-11-11 ENCOUNTER — APPOINTMENT (OUTPATIENT)
Dept: GENERAL RADIOLOGY | Age: 29
End: 2024-11-11
Attending: ORTHOPAEDIC SURGERY

## 2024-12-13 ENCOUNTER — PATIENT MESSAGE (OUTPATIENT)
Dept: OBGYN CLINIC | Facility: CLINIC | Age: 29
End: 2024-12-13

## 2024-12-16 DIAGNOSIS — N96 RECURRENT PREGNANCY LOSS: Primary | ICD-10-CM

## 2025-04-28 ENCOUNTER — PATIENT MESSAGE (OUTPATIENT)
Dept: OBGYN CLINIC | Facility: CLINIC | Age: 30
End: 2025-04-28

## 2025-04-28 DIAGNOSIS — Z32.01 PREGNANCY EXAMINATION OR TEST, POSITIVE RESULT (HCC): Primary | ICD-10-CM

## 2025-04-29 ENCOUNTER — LAB ENCOUNTER (OUTPATIENT)
Dept: LAB | Facility: HOSPITAL | Age: 30
End: 2025-04-29
Attending: OBSTETRICS & GYNECOLOGY
Payer: COMMERCIAL

## 2025-04-29 DIAGNOSIS — Z32.01 PREGNANCY EXAMINATION OR TEST, POSITIVE RESULT (HCC): ICD-10-CM

## 2025-04-29 LAB
B-HCG SERPL-ACNC: ABNORMAL MIU/ML (ref ?–4.2)
PROGEST SERPL-MCNC: 12.4 NG/ML
PROGESTERONE: 15.3 NG/ML

## 2025-04-29 PROCEDURE — 84144 ASSAY OF PROGESTERONE: CPT | Performed by: OBSTETRICS & GYNECOLOGY

## 2025-04-29 PROCEDURE — 84702 CHORIONIC GONADOTROPIN TEST: CPT | Performed by: OBSTETRICS & GYNECOLOGY

## 2025-05-02 DIAGNOSIS — Z34.90 EARLY STAGE OF PREGNANCY (HCC): Primary | ICD-10-CM

## 2025-05-02 LAB — HCG, TOTAL, QN: ABNORMAL MIU/ML

## 2025-05-07 ENCOUNTER — ULTRASOUND ENCOUNTER (OUTPATIENT)
Dept: OBGYN CLINIC | Facility: CLINIC | Age: 30
End: 2025-05-07
Payer: COMMERCIAL

## 2025-05-07 DIAGNOSIS — O36.80X0 ENCOUNTER TO DETERMINE FETAL VIABILITY OF PREGNANCY, SINGLE OR UNSPECIFIED FETUS (HCC): Primary | ICD-10-CM

## 2025-05-07 PROCEDURE — 76817 TRANSVAGINAL US OBSTETRIC: CPT | Performed by: OBSTETRICS & GYNECOLOGY

## 2025-05-08 ENCOUNTER — PATIENT MESSAGE (OUTPATIENT)
Dept: OBGYN CLINIC | Facility: CLINIC | Age: 30
End: 2025-05-08

## 2025-05-11 ENCOUNTER — TELEPHONE (OUTPATIENT)
Dept: OBGYN CLINIC | Facility: CLINIC | Age: 30
End: 2025-05-11

## 2025-05-11 RX ORDER — METOCLOPRAMIDE 10 MG/1
10 TABLET ORAL EVERY 6 HOURS PRN
Qty: 20 TABLET | Refills: 0 | Status: SHIPPED | OUTPATIENT
Start: 2025-05-11

## 2025-05-11 NOTE — TELEPHONE ENCOUNTER
Returned patient's call regarding nausea and vomiting. Reports nausea and vomiting all night, unable to keep anything down including fluids. Now seeing flecks of blood in the vomit. Would prefer to avoid the ED. Rx for Reglan sent to pharmacy. Advised patient to go slow and focus on hydration. If unable to tolerate fluids despite Reglan she should present to the ED, patient agrees.     Nieves Song, DO

## 2025-05-16 ENCOUNTER — TELEPHONE (OUTPATIENT)
Dept: OBGYN CLINIC | Facility: CLINIC | Age: 30
End: 2025-05-16

## 2025-05-17 NOTE — TELEPHONE ENCOUNTER
Telephone call:     Called patient back at 645 PM in regards to page about cramping. No answer and non-identifiable voicemail.     Dr. Gurpreet Mcdonnell MD    [facility]     This note was created by Dragon voice recognition. Errors in content may be related to improper recognition by the system; efforts to review and correct have been done but errors may still exist. Please be advised the primary purpose of this note is for me to communicate medical care. Standard sentence structure is not always used. Medical terminology and medical abbreviations may be used. There may be grammatical, typographical, and automated fill ins that may have errors missed in proofreading.

## 2025-05-29 ENCOUNTER — OFFICE VISIT (OUTPATIENT)
Dept: OBGYN CLINIC | Facility: CLINIC | Age: 30
End: 2025-05-29
Payer: COMMERCIAL

## 2025-05-29 VITALS
HEIGHT: 64 IN | DIASTOLIC BLOOD PRESSURE: 78 MMHG | SYSTOLIC BLOOD PRESSURE: 118 MMHG | BODY MASS INDEX: 29.71 KG/M2 | WEIGHT: 174 LBS

## 2025-05-29 DIAGNOSIS — N92.6 MISSED MENSES: Primary | ICD-10-CM

## 2025-05-29 DIAGNOSIS — O26.20 RECURRENT PREGNANCY LOSS, ANTEPARTUM CONDITION OR COMPLICATION (HCC): ICD-10-CM

## 2025-05-29 PROCEDURE — 3078F DIAST BP <80 MM HG: CPT | Performed by: OBSTETRICS & GYNECOLOGY

## 2025-05-29 PROCEDURE — 99213 OFFICE O/P EST LOW 20 MIN: CPT | Performed by: OBSTETRICS & GYNECOLOGY

## 2025-05-29 PROCEDURE — 3008F BODY MASS INDEX DOCD: CPT | Performed by: OBSTETRICS & GYNECOLOGY

## 2025-05-29 PROCEDURE — 3074F SYST BP LT 130 MM HG: CPT | Performed by: OBSTETRICS & GYNECOLOGY

## 2025-05-29 RX ORDER — ONDANSETRON 4 MG/1
4 TABLET, ORALLY DISINTEGRATING ORAL EVERY 4 HOURS PRN
Qty: 90 TABLET | Refills: 1 | Status: SHIPPED | OUTPATIENT
Start: 2025-05-29

## 2025-05-29 RX ORDER — PROGESTERONE 200 MG/1
200 CAPSULE ORAL NIGHTLY
Qty: 30 CAPSULE | Refills: 2 | Status: SHIPPED | OUTPATIENT
Start: 2025-05-29

## 2025-05-29 NOTE — PROGRESS NOTES
[facility]  Obstetrics and Gynecology    Chief Complaint:   Chief Complaint   Patient presents with    Pregnancy     US done 25       Subjective:     Arlen Oliveira is a 29 year old ,female, Patient's last menstrual period was 2025 (exact date). presents with missed menses and positive pregnancy test.     Nausea / vomiting.  Reglan makes her very tired.    No bleeding  Some cramping, on and off. Mild. Seems to be getting better.    Menarche: 13 (2025  1:41 PM)  Period Cycle (Days): 5 days (2025  1:41 PM)  Period Duration (Days): 28 days (2025  1:41 PM)  Period Flow: moderate (2025  1:41 PM)  Use of Birth Control (if yes, specify type): None (2025  1:41 PM)  Hx Prior Abnormal Pap: No (last pap  normal) (2025  1:41 PM)      Nausea/vomiting - denies  Breast tenderness - denies  Vaginal discharge - denies  Vaginal bleeding - denies  Urinary symptoms - denies      Review of Systems:  General: no complaints  Eyes: no complaints  Respiratory: no complaints  Cardiovascular: no complaints  GI: no complaints  : no complaints See HPI  Hematological/lymphatic: no complaints  Breast: no complaints  Psychiatric: no complaints  Endocrine:no complaints  Neurological: no complaints  Immunological: no complaints  Musculoskeletal:no complaints    OB History    Para Term  AB Living   3 0 0 0 2 0   SAB IAB Ectopic Multiple Live Births   2 0 0 0 0       Past Medical History[1]    Past Surgical History[2]    Social Hx on file[3]    Family History[4]    Medications - Current[5]      Health maintenance:  Health Maintenance   Topic Date Due    Annual Physical  Never done    DTaP,Tdap,and Td Vaccines (1 - Tdap) Never done    Pap Smear  Never done    COVID-19 Vaccine ( -  season) Never done    Influenza Vaccine (Season Ended) 10/01/2025    Annual Depression Screening  Completed    Pneumococcal Vaccine: Birth to 50yrs  Aged Out    Meningococcal B  Vaccine  Aged Out        Objective:     Vitals:    25 1339   BP: 118/78   Weight: 174 lb (78.9 kg)   Height: 64\"         Body mass index is 29.87 kg/m².    GENERAL: well developed, well nourished, in no apparent distress, alert and orientated X 3  PSYCH: mood and affect stable   SKIN: no rashes, no lesions  HEENT: normal  LUNGS: respiration unlabored  CARDIOVASCULAR: no peripheral edema or varicosities, skin warm and dry  ABDOMEN: Soft, non distended; non tender, no masses  EXTREMITIES:  Nontender without edema    Imaging:  BSUS:  active fetus,  bpm       Assessment:     Arlen Oliveira is a 29 year old  female who presents for pregnancy confirmation appointment.    Problem List[6]      Plan:       ICD-10-CM    1. Missed menses  N92.6 Chlamydia/Gc Amplification      2. Recurrent pregnancy loss, antepartum condition or complication (HCC)  O26.20 progesterone 200 MG Oral Cap        Missed menses  - positive pregnancy test, 9w6d (MARIA [facility] 25 based on LMP)  - US noted for viable IUP   - Pt counseled on safety, diet, exercise, caffiene, tobacco, ETOH, sexual activity, ER precautions, diet, exercise, appropriate otc medication use, substance abuse   - Counseled on taking a PNV with folic acid   - genetic screening options reviewed, will consider.  - advised to follow up to establish prenatal care - referred for RN education visit  - SAB precautions provided   - d/w nausea and vomiting in pregnancy including vitamin B 6 and unisom     All of the findings and plan were discussed with the patient.  She notes understanding and agrees with the plan of care.  All questions were answered to the best of my ability at this time.    RTC in 4 weeks or sooner if needed     DO NOEL Zhao           [1]   Past Medical History:   Recurrent pregnancy loss   [2]   Past Surgical History:  Procedure Laterality Date    Spinal fusion      [place] teeth removed     [3]   Social History  Socioeconomic  History    Marital status:    Tobacco Use    Smoking status: Never    Smokeless tobacco: Never   Substance and Sexual Activity    Alcohol use: Not Currently     Alcohol/week: 1.0 standard drink of alcohol     Types: 1 Standard drinks or equivalent per week     Comment: socially, couple times per month    Drug use: Never    Sexual activity: Yes     Partners: Male   Other Topics Concern    Blood Transfusions No   [4]   Family History  Problem Relation Age of Onset    No Known Problems Father     Other (bladder cancer) Mother     Hypertension Mother     Blood Cancer Maternal Grandmother     Other (lung cancer) Maternal Grandfather     Cancer Paternal Grandmother         brain spread to breast    No Known Problems Sister     No Known Problems Brother    [5]   Current Outpatient Medications:     progesterone 200 MG Oral Cap, Take 1 capsule (200 mg total) by mouth nightly. Take during first trimester, Disp: 30 capsule, Rfl: 2    ondansetron 4 MG Oral Tablet Dispersible, Take 1 tablet (4 mg total) by mouth every 4 (four) hours as needed for Nausea., Disp: 90 tablet, Rfl: 1    metoclopramide (REGLAN) 10 MG Oral Tab, Take 1 tablet (10 mg total) by mouth every 6 (six) hours as needed., Disp: 20 tablet, Rfl: 0  [6] There is no problem list on file for this patient.

## 2025-06-11 ENCOUNTER — PATIENT MESSAGE (OUTPATIENT)
Dept: OBGYN CLINIC | Facility: CLINIC | Age: 30
End: 2025-06-11

## 2025-06-19 ENCOUNTER — NURSE ONLY (OUTPATIENT)
Dept: OBGYN CLINIC | Facility: CLINIC | Age: 30
End: 2025-06-19
Payer: COMMERCIAL

## 2025-06-19 DIAGNOSIS — Z34.81 ENCOUNTER FOR SUPERVISION OF OTHER NORMAL PREGNANCY IN FIRST TRIMESTER (HCC): ICD-10-CM

## 2025-06-19 RX ORDER — CHOLECALCIFEROL (VITAMIN D3) 25 MCG
1 TABLET,CHEWABLE ORAL DAILY
COMMUNITY

## 2025-06-19 NOTE — PROGRESS NOTES
Pt is a G 3 P 0 for RN OB Education.       Pregnancy Confirmation apt with: 25 with      LMP: 3/21/25    US: 25 at 6 weeks 3 days    Working [facility]IA DEL ROS[facility]IO:  25    Pre  BMI:  29.8     Medical Hx significant for: N/A    Obstetrical Hx significant for: x2 SAB ()     Surgical Hx significant for: Spinal fusion and wisdom tooth removal     EPDS score: 3    OUD Screening: Patient has answered NO to 5p questions and has no  risk factors.     Patient given \"What Pregnant Women Need to Know\" handout.     Educational material reviewed with patient: Prenatal care, nutrition, weight gain recommendations, travel, exercise, intercourse, pregnancy changes, safe medications, pregnancy and work, fetal movement, labor and  labor, warning signs, food safety, tdap, cord blood, breastfeeding, circumcision, and Group B strep.     Pt agrees to blood transfusion if needed: Yes     PN labs ordered, will complete at [facility], informed pt to send fax number via my chart.     Optional genetic screening discussed.  Pt would like to think about Prequel and Foresight further. Informed can complete at next visit.     [facility] Media Policy: Reviewed and verbalized understanding.     NOB appt: 25    Lab appt: Will complete with [facility]    Vaccines: Informed about TDAP, Flu, Covid    ASA protocol :  Informed, pt does not qualify at this time    SODH:  Not high risk     Pt considering CNM care, but undecided

## 2025-07-01 ENCOUNTER — ROUTINE PRENATAL (OUTPATIENT)
Dept: OBGYN CLINIC | Facility: CLINIC | Age: 30
End: 2025-07-01
Payer: COMMERCIAL

## 2025-07-01 ENCOUNTER — LAB ENCOUNTER (OUTPATIENT)
Dept: LAB | Facility: REFERENCE LAB | Age: 30
End: 2025-07-01
Attending: OBSTETRICS & GYNECOLOGY
Payer: COMMERCIAL

## 2025-07-01 VITALS
BODY MASS INDEX: 30.08 KG/M2 | HEIGHT: 64 IN | DIASTOLIC BLOOD PRESSURE: 74 MMHG | WEIGHT: 176.19 LBS | SYSTOLIC BLOOD PRESSURE: 118 MMHG

## 2025-07-01 DIAGNOSIS — Z34.81 ENCOUNTER FOR SUPERVISION OF OTHER NORMAL PREGNANCY IN FIRST TRIMESTER (HCC): ICD-10-CM

## 2025-07-01 DIAGNOSIS — Z34.90 ENCOUNTER FOR SUPERVISION OF NORMAL PREGNANCY, ANTEPARTUM, UNSPECIFIED GRAVIDITY (HCC): Primary | ICD-10-CM

## 2025-07-01 LAB
ANTIBODY SCREEN: NEGATIVE
BASOPHILS # BLD AUTO: 0.06 X10(3) UL (ref 0–0.2)
BASOPHILS NFR BLD AUTO: 0.5 %
DEPRECATED RDW RBC AUTO: 48.3 FL (ref 35.1–46.3)
EOSINOPHIL # BLD AUTO: 0.31 X10(3) UL (ref 0–0.7)
EOSINOPHIL NFR BLD AUTO: 2.7 %
ERYTHROCYTE [DISTWIDTH] IN BLOOD BY AUTOMATED COUNT: 13.4 % (ref 11–15)
EST. AVERAGE GLUCOSE BLD GHB EST-MCNC: 74 MG/DL (ref 68–126)
HBA1C MFR BLD: 4.2 % (ref ?–5.7)
HBV SURFACE AG SER-ACNC: 0.1 [IU]/L
HBV SURFACE AG SERPL QL IA: NONREACTIVE
HCT VFR BLD AUTO: 37.2 % (ref 35–48)
HCV AB SERPL QL IA: NONREACTIVE
HGB BLD-MCNC: 12.9 G/DL (ref 12–16)
IMM GRANULOCYTES # BLD AUTO: 0.03 X10(3) UL (ref 0–1)
IMM GRANULOCYTES NFR BLD: 0.3 %
LYMPHOCYTES # BLD AUTO: 1.72 X10(3) UL (ref 1–4)
LYMPHOCYTES NFR BLD AUTO: 15.2 %
MCH RBC QN AUTO: 34.3 PG (ref 26–34)
MCHC RBC AUTO-ENTMCNC: 34.7 G/DL (ref 31–37)
MCV RBC AUTO: 98.9 FL (ref 80–100)
MONOCYTES # BLD AUTO: 0.62 X10(3) UL (ref 0.1–1)
MONOCYTES NFR BLD AUTO: 5.5 %
NEUTROPHILS # BLD AUTO: 8.55 X10 (3) UL (ref 1.5–7.7)
NEUTROPHILS # BLD AUTO: 8.55 X10(3) UL (ref 1.5–7.7)
NEUTROPHILS NFR BLD AUTO: 75.8 %
PLATELET # BLD AUTO: 220 10(3)UL (ref 150–450)
RBC # BLD AUTO: 3.76 X10(6)UL (ref 3.8–5.3)
RH BLOOD TYPE: POSITIVE
RUBV IGG SER QL: POSITIVE
RUBV IGG SER-ACNC: 97 IU/ML (ref 10–?)
T PALLIDUM AB SER QL IA: NONREACTIVE
WBC # BLD AUTO: 11.3 X10(3) UL (ref 4–11)

## 2025-07-01 PROCEDURE — 86900 BLOOD TYPING SEROLOGIC ABO: CPT | Performed by: OBSTETRICS & GYNECOLOGY

## 2025-07-01 PROCEDURE — 3074F SYST BP LT 130 MM HG: CPT | Performed by: OBSTETRICS & GYNECOLOGY

## 2025-07-01 PROCEDURE — 86780 TREPONEMA PALLIDUM: CPT | Performed by: OBSTETRICS & GYNECOLOGY

## 2025-07-01 PROCEDURE — 86850 RBC ANTIBODY SCREEN: CPT | Performed by: OBSTETRICS & GYNECOLOGY

## 2025-07-01 PROCEDURE — 3008F BODY MASS INDEX DOCD: CPT | Performed by: OBSTETRICS & GYNECOLOGY

## 2025-07-01 PROCEDURE — 86803 HEPATITIS C AB TEST: CPT | Performed by: OBSTETRICS & GYNECOLOGY

## 2025-07-01 PROCEDURE — 86762 RUBELLA ANTIBODY: CPT | Performed by: OBSTETRICS & GYNECOLOGY

## 2025-07-01 PROCEDURE — 83021 HEMOGLOBIN CHROMOTOGRAPHY: CPT | Performed by: OBSTETRICS & GYNECOLOGY

## 2025-07-01 PROCEDURE — 85025 COMPLETE CBC W/AUTO DIFF WBC: CPT | Performed by: OBSTETRICS & GYNECOLOGY

## 2025-07-01 PROCEDURE — 86901 BLOOD TYPING SEROLOGIC RH(D): CPT | Performed by: OBSTETRICS & GYNECOLOGY

## 2025-07-01 PROCEDURE — 83020 HEMOGLOBIN ELECTROPHORESIS: CPT | Performed by: OBSTETRICS & GYNECOLOGY

## 2025-07-01 PROCEDURE — 86765 RUBEOLA ANTIBODY: CPT | Performed by: OBSTETRICS & GYNECOLOGY

## 2025-07-01 PROCEDURE — 87389 HIV-1 AG W/HIV-1&-2 AB AG IA: CPT | Performed by: OBSTETRICS & GYNECOLOGY

## 2025-07-01 PROCEDURE — 83036 HEMOGLOBIN GLYCOSYLATED A1C: CPT | Performed by: OBSTETRICS & GYNECOLOGY

## 2025-07-01 PROCEDURE — 87340 HEPATITIS B SURFACE AG IA: CPT | Performed by: OBSTETRICS & GYNECOLOGY

## 2025-07-01 PROCEDURE — 3078F DIAST BP <80 MM HG: CPT | Performed by: OBSTETRICS & GYNECOLOGY

## 2025-07-01 RX ORDER — METOCLOPRAMIDE 10 MG/1
10 TABLET ORAL EVERY 6 HOURS PRN
Qty: 20 TABLET | Refills: 0 | Status: SHIPPED | OUTPATIENT
Start: 2025-07-01

## 2025-07-01 NOTE — PROGRESS NOTES
OB VISIT    Arlen Oliveira is a 30 year old  at 14w4d presenting for new OB visit. Today she reports no contractions, vaginal bleeding or leaking fluid.     Prenatal labs today  AFP at next visit      Follow up in 4wk.     Nieves Song DO

## 2025-07-02 LAB
HGB A2 MFR BLD: 2.8 % (ref 1.5–3.5)
HGB PNL BLD ELPH: 97.2 % (ref 95.5–100)
MEV IGG SER-ACNC: 80.4 AU/ML (ref 16.5–?)

## 2025-07-08 ENCOUNTER — TELEPHONE (OUTPATIENT)
Dept: OBGYN CLINIC | Facility: CLINIC | Age: 30
End: 2025-07-08

## 2025-07-08 NOTE — TELEPHONE ENCOUNTER
Left message to call back    Prequel normal and gender TBA    Pt called informed of normal and declined gender, agrees.

## 2025-07-10 ENCOUNTER — TELEPHONE (OUTPATIENT)
Dept: OBGYN CLINIC | Facility: CLINIC | Age: 30
End: 2025-07-10

## 2025-07-11 NOTE — TELEPHONE ENCOUNTER
Left message to call back    Called pt c/o last two to three days, side/groin stretching and c/o pressure abd \"here and there\".    Informed pt of round ligament pain, take tylenol, warm pack, pregnancy belt, monitor for contx/ vaginal bleeding, leaking of fluids, call office if occurs.    Pt c/o constipation informed of metamucil (increase fiber, drink water, pericolase, agrees.    Informed Dr. Song provider on-call

## 2025-07-14 ENCOUNTER — TELEPHONE (OUTPATIENT)
Dept: OBGYN CLINIC | Facility: CLINIC | Age: 30
End: 2025-07-14

## 2025-07-14 PROBLEM — Z13.71 SCREENING FOR GENETIC DISEASE CARRIER STATUS: Status: ACTIVE | Noted: 2025-07-14

## 2025-07-29 ENCOUNTER — LAB ENCOUNTER (OUTPATIENT)
Dept: LAB | Facility: HOSPITAL | Age: 30
End: 2025-07-29
Attending: OBSTETRICS & GYNECOLOGY

## 2025-07-29 ENCOUNTER — ROUTINE PRENATAL (OUTPATIENT)
Dept: OBGYN CLINIC | Facility: CLINIC | Age: 30
End: 2025-07-29
Payer: COMMERCIAL

## 2025-07-29 ENCOUNTER — HOSPITAL ENCOUNTER (OUTPATIENT)
Dept: ULTRASOUND IMAGING | Facility: HOSPITAL | Age: 30
Discharge: HOME | End: 2025-07-29
Attending: OBSTETRICS & GYNECOLOGY

## 2025-07-29 VITALS
DIASTOLIC BLOOD PRESSURE: 76 MMHG | BODY MASS INDEX: 31.38 KG/M2 | WEIGHT: 183.81 LBS | SYSTOLIC BLOOD PRESSURE: 134 MMHG | HEIGHT: 64 IN

## 2025-07-29 DIAGNOSIS — O26.899 PELVIC PRESSURE IN PREGNANCY (HCC): ICD-10-CM

## 2025-07-29 DIAGNOSIS — R10.2 PELVIC PRESSURE IN PREGNANCY (HCC): ICD-10-CM

## 2025-07-29 DIAGNOSIS — Z34.82 ENCOUNTER FOR SUPERVISION OF OTHER NORMAL PREGNANCY IN SECOND TRIMESTER (HCC): ICD-10-CM

## 2025-07-29 DIAGNOSIS — Z34.82 ENCOUNTER FOR SUPERVISION OF OTHER NORMAL PREGNANCY IN SECOND TRIMESTER (HCC): Primary | ICD-10-CM

## 2025-07-29 DIAGNOSIS — R35.0 URINARY FREQUENCY: ICD-10-CM

## 2025-07-29 PROBLEM — Z98.1 HISTORY OF SPINAL FUSION: Status: ACTIVE | Noted: 2025-07-29

## 2025-07-29 PROCEDURE — 82105 ALPHA-FETOPROTEIN SERUM: CPT | Performed by: OBSTETRICS & GYNECOLOGY

## 2025-07-29 PROCEDURE — 3008F BODY MASS INDEX DOCD: CPT | Performed by: OBSTETRICS & GYNECOLOGY

## 2025-07-29 PROCEDURE — 3075F SYST BP GE 130 - 139MM HG: CPT | Performed by: OBSTETRICS & GYNECOLOGY

## 2025-07-29 PROCEDURE — 76817 TRANSVAGINAL US OBSTETRIC: CPT | Performed by: OBSTETRICS & GYNECOLOGY

## 2025-07-29 PROCEDURE — 87086 URINE CULTURE/COLONY COUNT: CPT | Performed by: OBSTETRICS & GYNECOLOGY

## 2025-07-29 PROCEDURE — 3078F DIAST BP <80 MM HG: CPT | Performed by: OBSTETRICS & GYNECOLOGY

## 2025-07-30 ENCOUNTER — RESULTS FOLLOW-UP (OUTPATIENT)
Dept: OBGYN CLINIC | Facility: CLINIC | Age: 30
End: 2025-07-30

## 2025-08-01 LAB
AFP MOM: 0.98
AFP VALUE: 40.4 NG/ML
GA ON COLL DATE: 18.6 WEEKS
INSULIN DEP AFP: NO
MAT AGE AT EDD: 30.5 YR
MULTIPLE GEST AFP: NO
OSBR RISK 1 IN AFP: NORMAL
WEIGHT AFP: 183 LBS

## 2025-08-18 ENCOUNTER — ROUTINE PRENATAL (OUTPATIENT)
Dept: OBGYN CLINIC | Facility: CLINIC | Age: 30
End: 2025-08-18

## 2025-08-18 ENCOUNTER — ULTRASOUND ENCOUNTER (OUTPATIENT)
Dept: OBGYN CLINIC | Facility: CLINIC | Age: 30
End: 2025-08-18

## 2025-08-18 VITALS
SYSTOLIC BLOOD PRESSURE: 122 MMHG | WEIGHT: 189 LBS | HEIGHT: 64 IN | BODY MASS INDEX: 32.27 KG/M2 | DIASTOLIC BLOOD PRESSURE: 78 MMHG

## 2025-08-18 DIAGNOSIS — Z34.82 ENCOUNTER FOR SUPERVISION OF OTHER NORMAL PREGNANCY IN SECOND TRIMESTER (HCC): Primary | ICD-10-CM

## 2025-08-23 ENCOUNTER — RESULTS FOLLOW-UP (OUTPATIENT)
Dept: OBGYN CLINIC | Facility: CLINIC | Age: 30
End: 2025-08-23

## 2025-08-29 ENCOUNTER — TELEPHONE (OUTPATIENT)
Dept: OBGYN CLINIC | Facility: CLINIC | Age: 30
End: 2025-08-29

## (undated) NOTE — LETTER
Arlen Oliveira, :1995    CONSENT FOR PROCEDURE/SEDATION    1. I authorize the performance upon Arlen Oliveira  the following:  sonohysterogram     2. I authorize Dr. Sheila Chávez,  (and whomever is designated as the doctor’s assistant), to perform the above-mentioned procedures.    3. If any unforeseen conditions arise during this procedure calling for additional  procedures, operations, or medications (including anesthesia and blood transfusion), I further request and authorize the doctor to do whatever he/she deems advisable in my interest.    4. I consent to the taking and reproduction of any photographs in the course of this procedure for professional purposes.    5. I consent to the administration of such sedation as may be considered necessary or advisable by the physician responsible for this service, with the exception of ______________________________________________________    6. I have been informed by my doctor of the nature and purpose of this procedure sedation, possible alternative methods of treatment, risk involved and possible complications.        Signature of Patient:_______________________________________________    Signature of person authorized to consent for patient:  _______________________________________________________________    Relationship to patient: ____________________________________________    Witness: _________________________________________ Date:___________     Physician Signature: _______________________________ Date:___________